# Patient Record
Sex: FEMALE | Race: WHITE | Employment: UNEMPLOYED | URBAN - METROPOLITAN AREA
[De-identification: names, ages, dates, MRNs, and addresses within clinical notes are randomized per-mention and may not be internally consistent; named-entity substitution may affect disease eponyms.]

---

## 2018-02-27 PROCEDURE — 88175 CYTOPATH C/V AUTO FLUID REDO: CPT | Performed by: OBSTETRICS & GYNECOLOGY

## 2018-02-27 PROCEDURE — 87591 N.GONORRHOEAE DNA AMP PROB: CPT | Performed by: OBSTETRICS & GYNECOLOGY

## 2018-02-27 PROCEDURE — 87491 CHLMYD TRACH DNA AMP PROBE: CPT | Performed by: OBSTETRICS & GYNECOLOGY

## 2018-10-17 PROBLEM — N94.89: Status: ACTIVE | Noted: 2018-10-17

## 2018-10-17 PROBLEM — R19.09 MASS OF RIGHT INGUINAL REGION: Status: ACTIVE | Noted: 2018-10-17

## 2018-12-27 PROBLEM — O09.529 ANTEPARTUM MULTIGRAVIDA OF ADVANCED MATERNAL AGE: Status: ACTIVE | Noted: 2018-12-27

## 2018-12-27 PROBLEM — O46.8X1 SUBCHORIONIC HEMATOMA IN FIRST TRIMESTER: Status: ACTIVE | Noted: 2018-12-27

## 2018-12-27 PROBLEM — O41.8X10 SUBCHORIONIC HEMATOMA IN FIRST TRIMESTER (HCC): Status: ACTIVE | Noted: 2018-12-27

## 2018-12-27 PROBLEM — O41.8X10 SUBCHORIONIC HEMATOMA IN FIRST TRIMESTER: Status: ACTIVE | Noted: 2018-12-27

## 2018-12-27 PROBLEM — O09.529 ANTEPARTUM MULTIGRAVIDA OF ADVANCED MATERNAL AGE (HCC): Status: ACTIVE | Noted: 2018-12-27

## 2018-12-27 PROBLEM — O46.8X1 SUBCHORIONIC HEMATOMA IN FIRST TRIMESTER (HCC): Status: ACTIVE | Noted: 2018-12-27

## 2018-12-27 PROCEDURE — 86850 RBC ANTIBODY SCREEN: CPT | Performed by: OBSTETRICS & GYNECOLOGY

## 2018-12-27 PROCEDURE — 87389 HIV-1 AG W/HIV-1&-2 AB AG IA: CPT | Performed by: OBSTETRICS & GYNECOLOGY

## 2018-12-27 PROCEDURE — 87086 URINE CULTURE/COLONY COUNT: CPT | Performed by: OBSTETRICS & GYNECOLOGY

## 2018-12-27 PROCEDURE — 86901 BLOOD TYPING SEROLOGIC RH(D): CPT | Performed by: OBSTETRICS & GYNECOLOGY

## 2018-12-27 PROCEDURE — 86762 RUBELLA ANTIBODY: CPT | Performed by: OBSTETRICS & GYNECOLOGY

## 2018-12-27 PROCEDURE — 86900 BLOOD TYPING SEROLOGIC ABO: CPT | Performed by: OBSTETRICS & GYNECOLOGY

## 2018-12-27 PROCEDURE — 86780 TREPONEMA PALLIDUM: CPT | Performed by: OBSTETRICS & GYNECOLOGY

## 2019-01-16 PROBLEM — O09.529 SUPERVISION OF HIGH-RISK PREGNANCY OF ELDERLY MULTIGRAVIDA (HCC): Status: ACTIVE | Noted: 2019-01-16

## 2019-01-16 PROBLEM — O09.529 SUPERVISION OF HIGH-RISK PREGNANCY OF ELDERLY MULTIGRAVIDA: Status: ACTIVE | Noted: 2019-01-16

## 2019-02-20 PROCEDURE — 87086 URINE CULTURE/COLONY COUNT: CPT | Performed by: OBSTETRICS & GYNECOLOGY

## 2019-03-13 PROBLEM — O09.812 PREGNANCY RESULTING FROM ASSISTED REPRODUCTIVE TECHNOLOGY, SECOND TRIMESTER (HCC): Status: ACTIVE | Noted: 2019-03-13

## 2019-03-13 PROBLEM — O09.529 SUPERVISION OF HIGH-RISK PREGNANCY OF ELDERLY MULTIGRAVIDA: Status: RESOLVED | Noted: 2019-01-16 | Resolved: 2019-03-13

## 2019-03-13 PROBLEM — O09.812 PREGNANCY RESULTING FROM ASSISTED REPRODUCTIVE TECHNOLOGY, SECOND TRIMESTER: Status: ACTIVE | Noted: 2019-03-13

## 2019-03-13 PROBLEM — O09.529 SUPERVISION OF HIGH-RISK PREGNANCY OF ELDERLY MULTIGRAVIDA (HCC): Status: RESOLVED | Noted: 2019-01-16 | Resolved: 2019-03-13

## 2019-04-17 ENCOUNTER — OFFICE VISIT (OUTPATIENT)
Dept: PERINATAL CARE | Facility: HOSPITAL | Age: 39
End: 2019-04-17
Attending: PEDIATRICS
Payer: COMMERCIAL

## 2019-04-17 PROCEDURE — 76825 ECHO EXAM OF FETAL HEART: CPT

## 2019-04-17 PROCEDURE — 93325 DOPPLER ECHO COLOR FLOW MAPG: CPT

## 2019-04-17 PROCEDURE — 76827 ECHO EXAM OF FETAL HEART: CPT

## 2019-04-17 NOTE — PROGRESS NOTES
Hemal Sánchez     Dear Dr. Tyrone Naylor     Thank you for requesting fetal echocardiogram and  cardiology consultation on your patient Levy Colón.   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

## 2019-05-09 PROBLEM — O09.812 PREGNANCY RESULTING FROM ASSISTED REPRODUCTIVE TECHNOLOGY, SECOND TRIMESTER: Status: RESOLVED | Noted: 2019-03-13 | Resolved: 2019-05-09

## 2019-05-09 PROBLEM — O09.812 PREGNANCY RESULTING FROM ASSISTED REPRODUCTIVE TECHNOLOGY, SECOND TRIMESTER (HCC): Status: RESOLVED | Noted: 2019-03-13 | Resolved: 2019-05-09

## 2019-05-09 PROBLEM — O35.BXX0: Status: ACTIVE | Noted: 2019-05-09

## 2019-05-09 PROBLEM — O46.8X1 SUBCHORIONIC HEMATOMA IN FIRST TRIMESTER: Status: RESOLVED | Noted: 2018-12-27 | Resolved: 2019-05-09

## 2019-05-09 PROBLEM — O35.8XX0: Status: ACTIVE | Noted: 2019-05-09

## 2019-05-09 PROBLEM — O41.8X10 SUBCHORIONIC HEMATOMA IN FIRST TRIMESTER: Status: RESOLVED | Noted: 2018-12-27 | Resolved: 2019-05-09

## 2019-05-09 PROBLEM — O46.8X1 SUBCHORIONIC HEMATOMA IN FIRST TRIMESTER (HCC): Status: RESOLVED | Noted: 2018-12-27 | Resolved: 2019-05-09

## 2019-05-09 PROBLEM — O41.8X10 SUBCHORIONIC HEMATOMA IN FIRST TRIMESTER (HCC): Status: RESOLVED | Noted: 2018-12-27 | Resolved: 2019-05-09

## 2019-05-15 LAB — TREPONEMAL ANTIBODIES: NONREACTIVE

## 2019-05-15 PROCEDURE — 87389 HIV-1 AG W/HIV-1&-2 AB AG IA: CPT | Performed by: OBSTETRICS & GYNECOLOGY

## 2019-07-02 PROCEDURE — 87081 CULTURE SCREEN ONLY: CPT | Performed by: OBSTETRICS & GYNECOLOGY

## 2019-07-02 PROCEDURE — 87653 STREP B DNA AMP PROBE: CPT | Performed by: OBSTETRICS & GYNECOLOGY

## 2019-07-24 ENCOUNTER — TELEPHONE (OUTPATIENT)
Dept: OBGYN UNIT | Facility: HOSPITAL | Age: 39
End: 2019-07-24

## 2019-07-24 NOTE — PROGRESS NOTES
Pt. Has consulted with pediatric cardiologist at [facility]. Will bring information. Fetus has an anomoly of the heart, and will require follow up after delivery.

## 2019-07-25 ENCOUNTER — HOSPITAL ENCOUNTER (INPATIENT)
Facility: HOSPITAL | Age: 39
LOS: 2 days | Discharge: HOME | End: 2019-07-27
Attending: OBSTETRICS & GYNECOLOGY | Admitting: OBSTETRICS & GYNECOLOGY
Payer: COMMERCIAL

## 2019-07-25 ENCOUNTER — ANESTHESIA (OUTPATIENT)
Dept: OBGYN UNIT | Facility: HOSPITAL | Age: 39
End: 2019-07-25
Payer: COMMERCIAL

## 2019-07-25 ENCOUNTER — APPOINTMENT (OUTPATIENT)
Dept: OBGYN CLINIC | Facility: HOSPITAL | Age: 39
End: 2019-07-25
Attending: OBSTETRICS & GYNECOLOGY
Payer: COMMERCIAL

## 2019-07-25 ENCOUNTER — ANESTHESIA EVENT (OUTPATIENT)
Dept: OBGYN UNIT | Facility: HOSPITAL | Age: 39
End: 2019-07-25
Payer: COMMERCIAL

## 2019-07-25 PROBLEM — Z34.90 PREGNANCY: Status: ACTIVE | Noted: 2019-07-25

## 2019-07-25 PROBLEM — Z34.90 PREGNANCY (HCC): Status: ACTIVE | Noted: 2019-07-25

## 2019-07-25 LAB
ANTIBODY SCREEN: NEGATIVE
DEPRECATED RDW RBC AUTO: 48 FL (ref 35.1–46.3)
ERYTHROCYTE [DISTWIDTH] IN BLOOD BY AUTOMATED COUNT: 14.2 % (ref 11–15)
HCT VFR BLD AUTO: 35.2 % (ref 35–48)
HGB BLD-MCNC: 11.9 G/DL (ref 12–16)
MCH RBC QN AUTO: 31.4 PG (ref 26–34)
MCHC RBC AUTO-ENTMCNC: 33.8 G/DL (ref 31–37)
MCV RBC AUTO: 92.9 FL (ref 80–100)
PLATELET # BLD AUTO: 171 10(3)UL (ref 150–450)
RBC # BLD AUTO: 3.79 X10(6)UL (ref 3.8–5.3)
RH BLOOD TYPE: POSITIVE
WBC # BLD AUTO: 8.1 X10(3) UL (ref 4–11)

## 2019-07-25 PROCEDURE — 0KQM0ZZ REPAIR PERINEUM MUSCLE, OPEN APPROACH: ICD-10-PCS | Performed by: OBSTETRICS & GYNECOLOGY

## 2019-07-25 PROCEDURE — 10907ZC DRAINAGE OF AMNIOTIC FLUID, THERAPEUTIC FROM PRODUCTS OF CONCEPTION, VIA NATURAL OR ARTIFICIAL OPENING: ICD-10-PCS | Performed by: OBSTETRICS & GYNECOLOGY

## 2019-07-25 PROCEDURE — 86850 RBC ANTIBODY SCREEN: CPT | Performed by: OBSTETRICS & GYNECOLOGY

## 2019-07-25 PROCEDURE — 3E033VJ INTRODUCTION OF OTHER HORMONE INTO PERIPHERAL VEIN, PERCUTANEOUS APPROACH: ICD-10-PCS | Performed by: OBSTETRICS & GYNECOLOGY

## 2019-07-25 PROCEDURE — 86900 BLOOD TYPING SEROLOGIC ABO: CPT | Performed by: OBSTETRICS & GYNECOLOGY

## 2019-07-25 PROCEDURE — 85027 COMPLETE CBC AUTOMATED: CPT | Performed by: OBSTETRICS & GYNECOLOGY

## 2019-07-25 PROCEDURE — 10S0XZZ REPOSITION PRODUCTS OF CONCEPTION, EXTERNAL APPROACH: ICD-10-PCS | Performed by: OBSTETRICS & GYNECOLOGY

## 2019-07-25 PROCEDURE — 86901 BLOOD TYPING SEROLOGIC RH(D): CPT | Performed by: OBSTETRICS & GYNECOLOGY

## 2019-07-25 RX ORDER — LIDOCAINE HYDROCHLORIDE AND EPINEPHRINE 15; 5 MG/ML; UG/ML
INJECTION, SOLUTION EPIDURAL
Status: COMPLETED | OUTPATIENT
Start: 2019-07-25 | End: 2019-07-25

## 2019-07-25 RX ORDER — NALBUPHINE HCL 10 MG/ML
2.5 AMPUL (ML) INJECTION
Status: DISCONTINUED | OUTPATIENT
Start: 2019-07-25 | End: 2019-07-27

## 2019-07-25 RX ORDER — SODIUM CHLORIDE, SODIUM LACTATE, POTASSIUM CHLORIDE, CALCIUM CHLORIDE 600; 310; 30; 20 MG/100ML; MG/100ML; MG/100ML; MG/100ML
INJECTION, SOLUTION INTRAVENOUS CONTINUOUS
Status: DISCONTINUED | OUTPATIENT
Start: 2019-07-25 | End: 2019-07-25 | Stop reason: HOSPADM

## 2019-07-25 RX ORDER — DIAPER,BRIEF,INFANT-TODD,DISP
1 EACH MISCELLANEOUS EVERY 6 HOURS PRN
Status: DISCONTINUED | OUTPATIENT
Start: 2019-07-25 | End: 2019-07-27

## 2019-07-25 RX ORDER — TRISODIUM CITRATE DIHYDRATE AND CITRIC ACID MONOHYDRATE 500; 334 MG/5ML; MG/5ML
30 SOLUTION ORAL AS NEEDED
Status: DISCONTINUED | OUTPATIENT
Start: 2019-07-25 | End: 2019-07-25 | Stop reason: HOSPADM

## 2019-07-25 RX ORDER — SODIUM CHLORIDE 0.9 % (FLUSH) 0.9 %
10 SYRINGE (ML) INJECTION AS NEEDED
Status: DISCONTINUED | OUTPATIENT
Start: 2019-07-25 | End: 2019-07-25 | Stop reason: HOSPADM

## 2019-07-25 RX ORDER — LIDOCAINE HYDROCHLORIDE 10 MG/ML
INJECTION, SOLUTION INFILTRATION; PERINEURAL
Status: COMPLETED | OUTPATIENT
Start: 2019-07-25 | End: 2019-07-25

## 2019-07-25 RX ORDER — EPHEDRINE SULFATE/0.9% NACL/PF 25 MG/5 ML
5 SYRINGE (ML) INTRAVENOUS AS NEEDED
Status: DISCONTINUED | OUTPATIENT
Start: 2019-07-25 | End: 2019-07-27

## 2019-07-25 RX ORDER — ZOLPIDEM TARTRATE 5 MG/1
5 TABLET ORAL NIGHTLY PRN
Status: DISCONTINUED | OUTPATIENT
Start: 2019-07-25 | End: 2019-07-27

## 2019-07-25 RX ORDER — SODIUM CHLORIDE 0.9 % (FLUSH) 0.9 %
10 SYRINGE (ML) INJECTION AS NEEDED
Status: DISCONTINUED | OUTPATIENT
Start: 2019-07-25 | End: 2019-07-27

## 2019-07-25 RX ORDER — DOCUSATE SODIUM 100 MG/1
100 CAPSULE, LIQUID FILLED ORAL 2 TIMES DAILY
Status: DISCONTINUED | OUTPATIENT
Start: 2019-07-25 | End: 2019-07-27

## 2019-07-25 RX ORDER — ONDANSETRON 2 MG/ML
4 INJECTION INTRAMUSCULAR; INTRAVENOUS EVERY 6 HOURS PRN
Status: DISCONTINUED | OUTPATIENT
Start: 2019-07-25 | End: 2019-07-25

## 2019-07-25 RX ORDER — LIDOCAINE HYDROCHLORIDE 10 MG/ML
30 INJECTION, SOLUTION EPIDURAL; INFILTRATION; INTRACAUDAL; PERINEURAL ONCE
Status: DISCONTINUED | OUTPATIENT
Start: 2019-07-25 | End: 2019-07-25 | Stop reason: HOSPADM

## 2019-07-25 RX ORDER — BUPIVACAINE HYDROCHLORIDE 2.5 MG/ML
30 INJECTION, SOLUTION EPIDURAL; INFILTRATION; INTRACAUDAL ONCE
Status: DISCONTINUED | OUTPATIENT
Start: 2019-07-25 | End: 2019-07-27

## 2019-07-25 RX ORDER — BUPIVACAINE HYDROCHLORIDE 2.5 MG/ML
INJECTION, SOLUTION EPIDURAL; INFILTRATION; INTRACAUDAL
Status: COMPLETED | OUTPATIENT
Start: 2019-07-25 | End: 2019-07-25

## 2019-07-25 RX ORDER — IBUPROFEN 600 MG/1
600 TABLET ORAL EVERY 6 HOURS
Status: DISCONTINUED | OUTPATIENT
Start: 2019-07-25 | End: 2019-07-27

## 2019-07-25 RX ORDER — CHOLECALCIFEROL (VITAMIN D3) 25 MCG
1 TABLET,CHEWABLE ORAL DAILY
Status: DISCONTINUED | OUTPATIENT
Start: 2019-07-25 | End: 2019-07-27

## 2019-07-25 RX ORDER — IBUPROFEN 600 MG/1
600 TABLET ORAL ONCE AS NEEDED
Status: DISCONTINUED | OUTPATIENT
Start: 2019-07-25 | End: 2019-07-25 | Stop reason: HOSPADM

## 2019-07-25 RX ORDER — AMMONIA INHALANTS 0.04 G/.3ML
0.3 INHALANT RESPIRATORY (INHALATION) AS NEEDED
Status: DISCONTINUED | OUTPATIENT
Start: 2019-07-25 | End: 2019-07-25

## 2019-07-25 RX ORDER — SIMETHICONE 80 MG
80 TABLET,CHEWABLE ORAL 3 TIMES DAILY PRN
Status: DISCONTINUED | OUTPATIENT
Start: 2019-07-25 | End: 2019-07-27

## 2019-07-25 RX ORDER — TERBUTALINE SULFATE 1 MG/ML
0.25 INJECTION, SOLUTION SUBCUTANEOUS AS NEEDED
Status: DISCONTINUED | OUTPATIENT
Start: 2019-07-25 | End: 2019-07-25 | Stop reason: HOSPADM

## 2019-07-25 RX ORDER — DEXTROSE, SODIUM CHLORIDE, SODIUM LACTATE, POTASSIUM CHLORIDE, AND CALCIUM CHLORIDE 5; .6; .31; .03; .02 G/100ML; G/100ML; G/100ML; G/100ML; G/100ML
INJECTION, SOLUTION INTRAVENOUS CONTINUOUS
Status: DISCONTINUED | OUTPATIENT
Start: 2019-07-25 | End: 2019-07-25 | Stop reason: HOSPADM

## 2019-07-25 RX ORDER — BISACODYL 10 MG
10 SUPPOSITORY, RECTAL RECTAL ONCE AS NEEDED
Status: DISCONTINUED | OUTPATIENT
Start: 2019-07-25 | End: 2019-07-27

## 2019-07-25 RX ORDER — AMMONIA INHALANTS 0.04 G/.3ML
0.3 INHALANT RESPIRATORY (INHALATION) AS NEEDED
Status: DISCONTINUED | OUTPATIENT
Start: 2019-07-25 | End: 2019-07-27

## 2019-07-25 RX ORDER — ONDANSETRON 2 MG/ML
4 INJECTION INTRAMUSCULAR; INTRAVENOUS EVERY 6 HOURS PRN
Status: DISCONTINUED | OUTPATIENT
Start: 2019-07-25 | End: 2019-07-25 | Stop reason: HOSPADM

## 2019-07-25 RX ADMIN — LIDOCAINE HYDROCHLORIDE AND EPINEPHRINE 3 ML: 15; 5 INJECTION, SOLUTION EPIDURAL at 11:33:00

## 2019-07-25 RX ADMIN — BUPIVACAINE HYDROCHLORIDE 10 ML: 2.5 INJECTION, SOLUTION EPIDURAL; INFILTRATION; INTRACAUDAL at 11:33:00

## 2019-07-25 RX ADMIN — LIDOCAINE HYDROCHLORIDE 5 ML: 10 INJECTION, SOLUTION INFILTRATION; PERINEURAL at 11:33:00

## 2019-07-25 NOTE — PLAN OF CARE
Problem: Patient Centered Care  Goal: Patient preferences are identified and integrated in the patient's plan of care  Description  Interventions:  - What would you like us to know as we care for you?  Expecting a second baby girl  - Provide timely, compl Utilize distraction and/or relaxation techniques  - Monitor for opioid side effects  - Notify MD/LIP if interventions unsuccessful or patient reports new pain  - Anticipate increased pain with activity and pre-medicate as appropriate  Outcome: Progressing

## 2019-07-25 NOTE — ANESTHESIA PROCEDURE NOTES
Labor Analgesia  Date/Time: 7/25/2019 11:33 AM  Performed by: Sean Merino MD  Authorized by: Sean Merino MD     Patient Location:  OB  Reason for Block: labor epidural    Anesthesiologist:  Sean Merino MD  Performed by:   Anesthesiologist

## 2019-07-25 NOTE — ANESTHESIA POSTPROCEDURE EVALUATION
Patient: Haroldine Dakins    Procedure Summary     Date:  07/25/19 Room / Location:      Anesthesia Start:  1126 Anesthesia Stop:  3752    Procedure:  LABOR ANALGESIA Diagnosis:      Scheduled Providers:   Anesthesiologist:  MD Aristeo Baker

## 2019-07-25 NOTE — PROGRESS NOTES
L&D Progress Note    Unable to palpate presentation. Bedside u/s done showing Transverse/Oblique lie. Discussed option to do an ECV then use and abdominal binder and AROM. Pt and  agreeable. With bedside u/s, ECV done successfully.   Abdominal

## 2019-07-25 NOTE — PROGRESS NOTES
Pt is a 45year old female admitted to  E  . Patient presents with:  Scheduled Induction: AMA,IVF     Pt is  39w0d intra-uterine pregnancy. History obtained, consents signed. Oriented to room, staff, and plan of care.

## 2019-07-25 NOTE — ANESTHESIA PREPROCEDURE EVALUATION
Anesthesia PreOp Note    HPI:     Merlin Record is a 45year old female who presents for preoperative consultation requested by: * No surgeons listed *    Date of Surgery: 7/25/2019    * No procedures listed *  Indication: * No pre-op diagnosis entered Lidocaine HCl (PF) (XYLOCAINE) 1 % injection SOLN 30 mL 30 mL Intradermal Once Larisa Raymundo MD     ibuprofen (MOTRIN) tab 600 mg 600 mg Oral Once PRN Larisa Raymundo MD     oxyTOCIN (PITOCIN) 30 units/ 500 ml 0.9% NS premix infusion 300 mL/hr Diabetes Maternal Grandmother      Social History    Socioeconomic History      Marital status:       Spouse name: Not on file      Number of children: 1      Years of education: Not on file      Highest education level: Not on file    Occupational  05/15/2019             Vital Signs: There is no height or weight on file to calculate BMI. oral temperature is 97.3 °F (36.3 °C). Her blood pressure is 111/59 and her pulse is 68. Her respiration is 16 and oxygen saturation is 100%.     07/25/

## 2019-07-25 NOTE — L&D DELIVERY NOTE
[facility]    Vaginal Delivery Note    Jimena Quarto Patient Status:  Inpatient    1980 MRN [id number]   Location [address]  Attending Seema Rausch MD   Hosp Day # 0 PCP Saeid Tyler MD

## 2019-07-25 NOTE — PROGRESS NOTES
[facility]    Labor Progress Note    Jimena Quarto Patient Status:  Inpatient    1980 MRN [id number]   Location [address]  Attending Seema Rausch MD   Hosp Day # 0 PCP Saeid Tyler MD

## 2019-07-26 LAB
BASOPHILS # BLD AUTO: 0.04 X10(3) UL (ref 0–0.2)
BASOPHILS NFR BLD AUTO: 0.4 %
DEPRECATED RDW RBC AUTO: 47.7 FL (ref 35.1–46.3)
EOSINOPHIL # BLD AUTO: 0.08 X10(3) UL (ref 0–0.7)
EOSINOPHIL NFR BLD AUTO: 0.7 %
ERYTHROCYTE [DISTWIDTH] IN BLOOD BY AUTOMATED COUNT: 14.1 % (ref 11–15)
HCT VFR BLD AUTO: 29.4 % (ref 35–48)
HGB BLD-MCNC: 9.7 G/DL (ref 12–16)
IMM GRANULOCYTES # BLD AUTO: 0.1 X10(3) UL (ref 0–1)
IMM GRANULOCYTES NFR BLD: 0.9 %
LYMPHOCYTES # BLD AUTO: 1.82 X10(3) UL (ref 1–4)
LYMPHOCYTES NFR BLD AUTO: 16.7 %
MCH RBC QN AUTO: 30.9 PG (ref 26–34)
MCHC RBC AUTO-ENTMCNC: 33 G/DL (ref 31–37)
MCV RBC AUTO: 93.6 FL (ref 80–100)
MONOCYTES # BLD AUTO: 0.63 X10(3) UL (ref 0.1–1)
MONOCYTES NFR BLD AUTO: 5.8 %
NEUTROPHILS # BLD AUTO: 8.22 X10 (3) UL (ref 1.5–7.7)
NEUTROPHILS # BLD AUTO: 8.22 X10(3) UL (ref 1.5–7.7)
NEUTROPHILS NFR BLD AUTO: 75.5 %
PLATELET # BLD AUTO: 149 10(3)UL (ref 150–450)
RBC # BLD AUTO: 3.14 X10(6)UL (ref 3.8–5.3)
WBC # BLD AUTO: 10.9 X10(3) UL (ref 4–11)

## 2019-07-26 PROCEDURE — 85025 COMPLETE CBC W/AUTO DIFF WBC: CPT | Performed by: OBSTETRICS & GYNECOLOGY

## 2019-07-26 RX ORDER — IBUPROFEN 600 MG/1
600 TABLET ORAL EVERY 6 HOURS
Qty: 90 TABLET | Refills: 0 | Status: SHIPPED | OUTPATIENT
Start: 2019-07-26 | End: 2019-08-30

## 2019-07-26 NOTE — PROGRESS NOTES
[facility] - [facility]    OB/GYNE Progress Note      Yarelis Castro Patient Status:  Inpatient    1980 MRN [id number]   Location [facility] 3SE Attending Eriberto Mccabe MD   Hosp Day # 1 PCP Tessa Puri MD        Assess GLU 95 08/11/2017    CA 8.5 08/11/2017    ALB 4.0 08/11/2017    ALKPHO 40 08/11/2017    BILT 0.89 08/11/2017    TP 7.0 08/11/2017    AST 17 08/11/2017    ALT 22 08/11/2017    TSH 2.881 08/11/2017       Lab Results   Component Value Date    RPR Non React

## 2019-07-26 NOTE — H&P
[address] [place] [address] Patient Status:  Inpatient    1980 MRN [id number]   Location [facility] 3SE Attending Rebecca Antoine MD   Hosp Day # 0 PCP Anjel Alvarado MD     Active Problems:    VSD (ventricular Pupils are equal, round, and reactive to light. Conjunctivae and EOM are normal.   Neck: Normal range of motion. Cardiovascular: Normal rate, regular rhythm and normal heart sounds.    Pulmonary/Chest: Effort normal and breath sounds normal. No respirator

## 2019-07-26 NOTE — PLAN OF CARE
Problem: Patient Centered Care  Goal: Patient preferences are identified and integrated in the patient's plan of care  Description  Interventions:  - What would you like us to know as we care for you?   - Provide timely, complete, and accurate informatio MD/LIP if interventions unsuccessful or patient reports new pain  - Anticipate increased pain with activity and pre-medicate as appropriate  Outcome: Completed     Problem: ANXIETY  Goal: Will report anxiety at manageable levels  Description  INTERVENTIONS

## 2019-07-26 NOTE — PROGRESS NOTES
Patient up to bathroom with assist x 2. Voided 250ml. Patient transferred to mother/baby room 365 per wheelchair in stable condition with baby and personal belongings. Accompanied by significant other and staff.  Upon arriving to room, patient complained

## 2019-07-27 VITALS
SYSTOLIC BLOOD PRESSURE: 109 MMHG | DIASTOLIC BLOOD PRESSURE: 69 MMHG | TEMPERATURE: 98 F | OXYGEN SATURATION: 100 % | HEART RATE: 79 BPM | RESPIRATION RATE: 16 BRPM

## 2019-07-27 RX ORDER — IBUPROFEN 600 MG/1
600 TABLET ORAL EVERY 6 HOURS PRN
Status: DISCONTINUED | OUTPATIENT
Start: 2019-07-27 | End: 2019-07-27

## 2019-07-27 NOTE — DISCHARGE SUMMARY
[facility]    Discharge Summary    Fitz Gaffney Patient Status:  Inpatient    1980 MRN [id number]   Location [facility] 3SE Attending Clark Finn MD   Hosp Day # 2 PCP Bessy Castro MD     Date of Admis

## 2019-07-29 PROBLEM — O35.8XX0: Status: RESOLVED | Noted: 2019-05-09 | Resolved: 2019-07-25

## 2019-07-29 PROBLEM — O09.529 ANTEPARTUM MULTIGRAVIDA OF ADVANCED MATERNAL AGE: Status: RESOLVED | Noted: 2018-12-27 | Resolved: 2019-07-25

## 2019-07-29 PROBLEM — O09.529 ANTEPARTUM MULTIGRAVIDA OF ADVANCED MATERNAL AGE (HCC): Status: RESOLVED | Noted: 2018-12-27 | Resolved: 2019-07-25

## 2019-07-29 PROBLEM — O35.BXX0: Status: RESOLVED | Noted: 2019-05-09 | Resolved: 2019-07-25

## 2019-09-30 PROCEDURE — 88342 IMHCHEM/IMCYTCHM 1ST ANTB: CPT | Performed by: SURGERY

## 2019-09-30 PROCEDURE — 88305 TISSUE EXAM BY PATHOLOGIST: CPT | Performed by: SURGERY

## 2020-07-27 PROBLEM — Z34.90 PREGNANCY: Status: RESOLVED | Noted: 2019-07-25 | Resolved: 2020-07-27

## 2020-07-27 PROBLEM — Z34.90 PREGNANCY (HCC): Status: RESOLVED | Noted: 2019-07-25 | Resolved: 2020-07-27

## 2021-10-08 PROBLEM — R10.31 RIGHT INGUINAL PAIN: Status: ACTIVE | Noted: 2021-10-08

## 2022-03-01 PROBLEM — Z91.89 AT HIGH RISK FOR BREAST CANCER: Status: ACTIVE | Noted: 2022-03-01

## 2022-08-24 ENCOUNTER — LAB ENCOUNTER (OUTPATIENT)
Dept: LAB | Facility: HOSPITAL | Age: 42
End: 2022-08-24
Attending: SPECIALIST
Payer: COMMERCIAL

## 2022-08-24 ENCOUNTER — EKG ENCOUNTER (OUTPATIENT)
Dept: LAB | Facility: HOSPITAL | Age: 42
End: 2022-08-24
Attending: SPECIALIST
Payer: COMMERCIAL

## 2022-08-24 DIAGNOSIS — Z41.1 ENCOUNTER FOR COSMETIC SURGERY: ICD-10-CM

## 2022-08-24 LAB
ALBUMIN SERPL-MCNC: 3.6 G/DL (ref 3.4–5)
ALBUMIN/GLOB SERPL: 1.2 {RATIO} (ref 1–2)
ALP LIVER SERPL-CCNC: 32 U/L
ALT SERPL-CCNC: 18 U/L
ANION GAP SERPL CALC-SCNC: 5 MMOL/L (ref 0–18)
AST SERPL-CCNC: 10 U/L (ref 15–37)
BASOPHILS # BLD AUTO: 0.05 X10(3) UL (ref 0–0.2)
BASOPHILS NFR BLD AUTO: 0.8 %
BILIRUB SERPL-MCNC: 1.2 MG/DL (ref 0.1–2)
BUN BLD-MCNC: 17 MG/DL (ref 7–18)
BUN/CREAT SERPL: 17.9 (ref 10–20)
CALCIUM BLD-MCNC: 8.6 MG/DL (ref 8.5–10.1)
CHLORIDE SERPL-SCNC: 110 MMOL/L (ref 98–112)
CO2 SERPL-SCNC: 26 MMOL/L (ref 21–32)
CREAT BLD-MCNC: 0.95 MG/DL
DEPRECATED RDW RBC AUTO: 45.1 FL (ref 35.1–46.3)
EOSINOPHIL # BLD AUTO: 0.03 X10(3) UL (ref 0–0.7)
EOSINOPHIL NFR BLD AUTO: 0.5 %
ERYTHROCYTE [DISTWIDTH] IN BLOOD BY AUTOMATED COUNT: 13.2 % (ref 11–15)
FASTING STATUS PATIENT QL REPORTED: NO
GFR SERPLBLD BASED ON 1.73 SQ M-ARVRAT: 77 ML/MIN/1.73M2 (ref 60–?)
GLOBULIN PLAS-MCNC: 2.9 G/DL (ref 2.8–4.4)
GLUCOSE BLD-MCNC: 91 MG/DL (ref 70–99)
HCT VFR BLD AUTO: 38.4 %
HGB BLD-MCNC: 12.4 G/DL
IMM GRANULOCYTES # BLD AUTO: 0.02 X10(3) UL (ref 0–1)
IMM GRANULOCYTES NFR BLD: 0.3 %
LYMPHOCYTES # BLD AUTO: 1.75 X10(3) UL (ref 1–4)
LYMPHOCYTES NFR BLD AUTO: 26.3 %
MCH RBC QN AUTO: 29.8 PG (ref 26–34)
MCHC RBC AUTO-ENTMCNC: 32.3 G/DL (ref 31–37)
MCV RBC AUTO: 92.3 FL
MONOCYTES # BLD AUTO: 0.5 X10(3) UL (ref 0.1–1)
MONOCYTES NFR BLD AUTO: 7.5 %
NEUTROPHILS # BLD AUTO: 4.31 X10 (3) UL (ref 1.5–7.7)
NEUTROPHILS # BLD AUTO: 4.31 X10(3) UL (ref 1.5–7.7)
NEUTROPHILS NFR BLD AUTO: 64.6 %
OSMOLALITY SERPL CALC.SUM OF ELEC: 293 MOSM/KG (ref 275–295)
PLATELET # BLD AUTO: 262 10(3)UL (ref 150–450)
POTASSIUM SERPL-SCNC: 4.2 MMOL/L (ref 3.5–5.1)
PROT SERPL-MCNC: 6.5 G/DL (ref 6.4–8.2)
RBC # BLD AUTO: 4.16 X10(6)UL
SODIUM SERPL-SCNC: 141 MMOL/L (ref 136–145)
WBC # BLD AUTO: 6.7 X10(3) UL (ref 4–11)

## 2022-08-24 PROCEDURE — 93005 ELECTROCARDIOGRAM TRACING: CPT

## 2022-08-24 PROCEDURE — 85025 COMPLETE CBC W/AUTO DIFF WBC: CPT

## 2022-08-24 PROCEDURE — 93010 ELECTROCARDIOGRAM REPORT: CPT | Performed by: SPECIALIST

## 2022-08-24 PROCEDURE — 36415 COLL VENOUS BLD VENIPUNCTURE: CPT

## 2022-08-24 PROCEDURE — 80053 COMPREHEN METABOLIC PANEL: CPT

## 2024-01-03 NOTE — PAT NURSING NOTE
Preprocedure Instructions MARILY 1/9/24     Pre-Procedure Instructions: Encouraged to check in electronically via yeppt     Visitor Instructions     Adult Patients: 2 Adult [facility] can accompany the patient day of procedure. 2 Care Partners may visit [phone] during inpatient stay     PreOp Instructions     You are scheduled for: a Cardiac Procedure- MARILY     Date of Procedure: 01/09/24 Tuesday     Diet Instructions: Do not eat or drink anything after midnight     Medications: Medications you are allowed to take can be taken with a sip of water the morning of your procedure     Medications to Stop: Hold herbal supplements and vitamins on day of procedure     Arrival Time: You will receive a call the afternoon before your procedure after 3 pm on what time you should arrive the day of your procedure    Driving After Procedure: If sedation is given, you WILL NOT be able to drive home. You will need a responsible adult  to drive you home.;Cannot take uber or cab unless approved by physician     Discharge Teaching: Your nurse will give you specific instructions before discharge;Most people can resume normal activities in 2-3 days;Any questions, please call the physician's office     [employer] parking is available starting at 6 am or park in the [place] parking garage at [facility]. Check in at the [facility] reception desk. Our  will be there to check you in for your procedure. Please bring your insurance cards and ID with you.                                                                                                                                      Please DO NOT respond to this message, the inbasket is not monitored for messages. For any questions, please call the physician's office.

## 2024-01-09 ENCOUNTER — HOSPITAL ENCOUNTER (OUTPATIENT)
Dept: INTERVENTIONAL RADIOLOGY/VASCULAR | Facility: HOSPITAL | Age: 44
Discharge: HOME | End: 2024-01-09
Attending: INTERNAL MEDICINE | Admitting: INTERNAL MEDICINE
Payer: COMMERCIAL

## 2024-01-09 ENCOUNTER — HOSPITAL ENCOUNTER (OUTPATIENT)
Dept: CV DIAGNOSTICS | Facility: HOSPITAL | Age: 44
Discharge: HOME | End: 2024-01-09
Attending: INTERNAL MEDICINE
Payer: COMMERCIAL

## 2024-01-09 VITALS
RESPIRATION RATE: 15 BRPM | TEMPERATURE: 98 F | OXYGEN SATURATION: 97 % | DIASTOLIC BLOOD PRESSURE: 67 MMHG | WEIGHT: 119 LBS | BODY MASS INDEX: 19.13 KG/M2 | HEIGHT: 66 IN | SYSTOLIC BLOOD PRESSURE: 95 MMHG | HEART RATE: 47 BPM

## 2024-01-09 DIAGNOSIS — I05.1 RHEUMATIC MITRAL REGURGITATION: ICD-10-CM

## 2024-01-09 LAB — B-HCG UR QL: NEGATIVE

## 2024-01-09 PROCEDURE — 81025 URINE PREGNANCY TEST: CPT

## 2024-01-09 PROCEDURE — 93312 ECHO TRANSESOPHAGEAL: CPT | Performed by: INTERNAL MEDICINE

## 2024-01-09 PROCEDURE — 93325 DOPPLER ECHO COLOR FLOW MAPG: CPT | Performed by: INTERNAL MEDICINE

## 2024-01-09 PROCEDURE — 99153 MOD SED SAME PHYS/QHP EA: CPT | Performed by: INTERNAL MEDICINE

## 2024-01-09 PROCEDURE — 93320 DOPPLER ECHO COMPLETE: CPT | Performed by: INTERNAL MEDICINE

## 2024-01-09 PROCEDURE — B24BZZ4 ULTRASONOGRAPHY OF HEART WITH AORTA, TRANSESOPHAGEAL: ICD-10-PCS | Performed by: INTERNAL MEDICINE

## 2024-01-09 PROCEDURE — 99152 MOD SED SAME PHYS/QHP 5/>YRS: CPT | Performed by: INTERNAL MEDICINE

## 2024-01-09 RX ORDER — MIDAZOLAM HYDROCHLORIDE 1 MG/ML
1 INJECTION INTRAMUSCULAR; INTRAVENOUS ONCE
Status: COMPLETED | OUTPATIENT
Start: 2024-01-09 | End: 2024-01-09

## 2024-01-09 RX ORDER — SODIUM CHLORIDE 9 MG/ML
INJECTION, SOLUTION INTRAVENOUS
Status: COMPLETED | OUTPATIENT
Start: 2024-01-10 | End: 2024-01-09

## 2024-01-09 RX ORDER — MIDAZOLAM HYDROCHLORIDE 1 MG/ML
INJECTION INTRAMUSCULAR; INTRAVENOUS
Status: COMPLETED
Start: 2024-01-09 | End: 2024-01-09

## 2024-01-09 RX ADMIN — SODIUM CHLORIDE: 9 INJECTION, SOLUTION INTRAVENOUS at 07:00:00

## 2024-01-09 RX ADMIN — MIDAZOLAM HYDROCHLORIDE 8 MG: 1 INJECTION INTRAMUSCULAR; INTRAVENOUS at 08:46:00

## 2024-01-09 NOTE — PROCEDURES
Transesophageal Echocardiogram Report    PREOPERATIVE DIAGNOSIS: Mitral regurgitation    POSTOPERATIVE DIAGNOSIS: Mitral regurgitation    PROCEDURE PERFORMED: Transesophageal echocardiogram  (CPT code 65026) with Doppler echocardiography (CPT code 18488), color flow velocity mapping (CPT code 43635) and 3D rendering with interpretation not requiring image postprocessing on a workstation (CPT code 79877). Maximum intensity projections and 3D volume rendering were utilized.     TECHNIQUE: The risks, benefits, and alternatives to transesophageal echocardiography were discussed with the patient. The risks included, but were not limited to: dental trauma, respiratory failure, esophageal perforation and death. The benefits of the procedure included: identification of a cardiac source of embolus, evaluation of valvular disease, evaluation of aortic pathology, and identification of endocarditis. Alternatives to the procedure included: not performing a transesophageal echocardiogram, treatment with medications only and observation. he verbalized understanding and agreed to proceed with the procedure.     Benzocaine spray was applied to the oropharynx for local anesthesia. The patient was placed in the left lateral decubitus position. A bite block was placed. Intravenous sedation was administered, with the patient monitored by continuous pulse oximetry and cardiac telemetry.     Intravenous sedation was administered by the nurse (trained independent observer) under my supervision from 0846 to 0919. A total of 8 mg midazolam and 150 mcg of fentanyl was administered.    Once adequate sedation was achieved, a lubricated transesophageal echocardiography probe was inserted orally. It was smoothly advanced to the upper esophageal and mid esophageal positions. Imaging was obtained. It was then advanced to the deep gastric position. Further imaging was obtained. It was then slowly withdrawn orally. No complications were noted at  the end of the procedure.    FINDINGS:  2D and 3D echocardiography:  The left ventricle appears normal in size, thickness, and systolic function. The estimated left ventricular ejection fraction is 60%. The right ventricle appears normal in size, thickness and systolic function. The visualized portions of the tricuspid valve and pulmonic valve have normal morphology and motion. There is a large cleft in the A2 scallop of the mitral valve. The medial papillary muscle is anteriorly displaced, and from originates the left ventricular outflow tract. The majority of chordae appear to originate from the lateral (more posterior) papillary muscle. The aortic valve morphology appears trileaflet, with normal leaflet motion. There is no evidence of valvular vegetations, intracardiac masses or thrombi. The interatrial septum appeared intact by 2D imaging. The visualized portion of the thoracic aorta appeared normal. Agitated saline contrast bubble study was not performed.    Spectral, 2D color and 3D color Doppler echocardiography:  The tricuspid valve has trace regurgitation. The pulmonic valve has trace regurgitation. The mitral valve has moderate-to-severe, 3+, posteriorly directed regurgitation originating from the A2 cleft. There is systolic flow reversal seen in the left upper pulmonary vein. The aortic valve has mild regurgitation. Left atrial appendage velocities are consistent with preserved left atrial transit function.      CONCLUSIONS:   1. There is a large cleft in the A2 scallop of the mitral valve. The medial papillary muscle is anteriorly displaced, and from originates the left ventricular outflow tract. The majority of chordae appear to originate from the lateral (more posterior) papillary muscle. The mitral valve has moderate-to-severe, 3+, posteriorly directed regurgitation originating from the A2 cleft. There is systolic flow reversal seen in the left upper pulmonary vein.  2. Normal left ventricular  systolic function. The estimated ejection fraction is 60%.    Tone Sanders MD, [facility]  1/9/2024  12:36 PM

## 2024-01-09 NOTE — DISCHARGE INSTRUCTIONS
Home Care Instructions following Transesophageal Echocardiography    Activity:  Do not drive after procedure. You may resume driving the following day.  Do not operate any machinery (including kitchen appliances or power tools).  Avoid drinking alcohol for 24 hours.  You may resume your normal activities tomorrow.  Do not eat anything for 2 hours.  Sip cool liquids initially and advance to a soft diet tonight.    What is normal:  You may experience a sore throat for 2-3 days following the examination.  Gargling with warm salt water (1/2 teaspoon of salt to 8 oz of warm water) or using throat lozenges that will help to soothe your throat.    When you should notify your physician:  If you experience sharp pain in your neck/abdomen/chest  If you have sudden nausea and/or vomitting  If you vomit blood  If you have a fever greater than 100 degrees.    Other:  You may resume your present diet, unless otherwise specified by your physician.  You may resume all of your medications as prescribed, unless otherwise directed by your physician.  A list of your medications was provided to you at discharge.  Please call your physician's office for a follow up appointment. You should be seen in 2 weeks.

## 2024-01-09 NOTE — H&P
The patient was seen and examined.     There was no change in the patient's clinical status from the date of the office note, to the date of their procedure at [facility]. Thus, the progress note is up-to-date.    The risks, benefits, and alternatives to transesophageal echocardiography were discussed with the patient.  The risks included, but were not limited to: dental trauma, respiratory failure, esophageal perforation and death.  The benefits of the procedure included: identification of a cardiac source of embolus, evaluation of valvular disease, evaluation of aortic pathology, and identification of endocarditis.  Alternatives to the procedure included: not performing a transesophageal echocardiogram, treatment with medications only and observation.  The patient verbalized understanding and agreed to proceed with the procedure.       Tone Sanders MD, FACC  1/9/2024  8:41 AM    Cards    FU: MVP    Notes MONAHAN.    Can do 3-5 min on treadmill before any dyspnea.    Can walk 2 flights of stairs.    No orthopnea.    Does have some rare palps. Few seconds. Resolves.    Does have some dizziness if too much caffeine.    No syncope.    Not sure about exertional capacity as has 4 year old and there are pregnancy related changes.    No LE edema.    Past Medical History:   Diagnosis Date   Anxiety state   Arrhythmia   Depression   Genetic testing 10/06/2023   [employer] [facility] 81-gene panel negative for mutations, VUS on MSH6   Mitral regurgitation   Has echocardiogram regularly Cardiologist Dr. Piotr Mayes   Mitral valve prolapse   Nausea & vomiting   Vaginal bleeding   SUBCHORIONIC HEM   VSD (ventricular septal defect) 1981   repaired as a child. Has cardiologist     Current Outpatient Medications   Medication Sig Dispense Refill   ALPRAZolam 0.5 MG Oral Tab Take 0.25-0.5 mg by mouth 2 (two) times daily as needed.   Multiple Vitamin (ONE-DAILY MULTI VITAMINS) Oral Tab Take 1 tablet by mouth daily.     Physical  Exam:   General: NAD  Neck: No JVD  Lungs: CTA bilat  Heart: RRR, S1, S2  Abdomen: Soft, NT/ND, BS+x4  Extremities: Warm, dry, no LE edema bilat  Pulses: 2+ bilat DP  Skin: no rashes or legions noted  Neurological: AAOx3, MAEW    43 with MVP and hx VSD.    MVP: Echo. May need MARILY.  Hx VSD: Echo. No murmur.  MONAHAN: Echo.    FU 6 months if echo totally stable.    [facility] MD

## 2024-01-09 NOTE — PROGRESS NOTES
Pt s/p MARILY. Pt tolerated procedure well.Post procedure suctioned clear secretions from mouth. VSS. Pt denied pain. Tolerated PO intake. IV d/c'd. Discharge instructions given-pt verbalized understanding. Pt to lobby via WC and  to drive home.

## 2024-01-10 ENCOUNTER — PATIENT OUTREACH (OUTPATIENT)
Dept: INTERNAL MEDICINE CLINIC | Facility: CLINIC | Age: 44
End: 2024-01-10

## 2024-01-10 NOTE — PROGRESS NOTES
Called pt to schedule a hospital follow-up appt :    [facility]  Follow up with Lance Cox  THE OFFICE WILL CALL YOU WITH A FOLLOW UP APPOINTMENT  [address] [place] [id number] [phone]    NO ANSWER, LVM TO CALL BACK FOR ASSISTANCE WITH SCHEDULING

## 2024-01-11 NOTE — PROGRESS NOTES
2nd attempt [facility] apt request  CARDS -unable to contact pt after multiple attempts  Closing encounter

## 2024-01-22 ENCOUNTER — PATIENT OUTREACH (OUTPATIENT)
Dept: INTERNAL MEDICINE CLINIC | Facility: CLINIC | Age: 44
End: 2024-01-22

## 2024-01-22 NOTE — PROGRESS NOTES
Returned call to pt, to assist with scheduling her hospital follow-up appt :    [facility]  Follow up with Lance Cox  THE OFFICE WILL CALL YOU WITH A FOLLOW UP APPOINTMENT  [place] [address] [address] [place] [id number] [phone]  Friday 2/02 @8am w/ Amanda KIM/JASON, CHFN      Confirmed appt date, time & location.  Pt verbalized that she understands & no further assistance needed.    Closing encounter

## 2025-03-25 DIAGNOSIS — G89.18 POST-OP PAIN: Primary | ICD-10-CM

## 2025-03-25 RX ORDER — ACETAMINOPHEN 500 MG
1000 TABLET ORAL EVERY 8 HOURS
Qty: 40 TABLET | Refills: 0 | Status: SHIPPED | OUTPATIENT
Start: 2025-03-25

## 2025-03-25 RX ORDER — CLINDAMYCIN HYDROCHLORIDE 300 MG/1
300 CAPSULE ORAL 3 TIMES DAILY
Qty: 15 CAPSULE | Refills: 0 | Status: SHIPPED | OUTPATIENT
Start: 2025-03-25 | End: 2025-03-30

## 2025-03-25 RX ORDER — OXYCODONE HYDROCHLORIDE 5 MG/1
5 TABLET ORAL EVERY 6 HOURS PRN
Qty: 20 TABLET | Refills: 0 | Status: SHIPPED | OUTPATIENT
Start: 2025-03-25

## 2025-03-25 RX ORDER — GABAPENTIN 300 MG/1
300 CAPSULE ORAL 3 TIMES DAILY
Qty: 30 CAPSULE | Refills: 0 | Status: SHIPPED | OUTPATIENT
Start: 2025-03-25

## 2025-03-25 RX ORDER — ONDANSETRON 4 MG/1
4 TABLET, FILM COATED ORAL EVERY 8 HOURS PRN
Qty: 8 TABLET | Refills: 0 | Status: SHIPPED | OUTPATIENT
Start: 2025-03-25

## 2025-04-21 ENCOUNTER — NURSE ONLY (OUTPATIENT)
Dept: INTERNAL MEDICINE CLINIC | Facility: HOSPITAL | Age: 45
End: 2025-04-21
Attending: PREVENTIVE MEDICINE

## 2025-04-21 DIAGNOSIS — Z00.00 WELLNESS EXAMINATION: Primary | ICD-10-CM

## 2025-04-21 LAB
MEV IGG SER-ACNC: <5 AU/ML (ref 16.5–?)
MUV IGG SER IA-ACNC: <5 AU/ML (ref 11–?)
VZV IGG SER IA-ACNC: 6.69 (ref 1–?)

## 2025-04-21 PROCEDURE — 86480 TB TEST CELL IMMUN MEASURE: CPT

## 2025-04-21 PROCEDURE — 86735 MUMPS ANTIBODY: CPT

## 2025-04-21 PROCEDURE — 86765 RUBEOLA ANTIBODY: CPT

## 2025-04-21 PROCEDURE — 86787 VARICELLA-ZOSTER ANTIBODY: CPT

## 2025-04-23 LAB
M TB IFN-G CD4+ T-CELLS BLD-ACNC: 0.01 IU/ML
M TB TUBERC IFN-G BLD QL: NEGATIVE
M TB TUBERC IGNF/MITOGEN IGNF CONTROL: >10 IU/ML
QFT TB1 AG MINUS NIL: 0.03 IU/ML
QFT TB2 AG MINUS NIL: 0.01 IU/ML